# Patient Record
Sex: FEMALE | Race: WHITE | NOT HISPANIC OR LATINO | URBAN - METROPOLITAN AREA
[De-identification: names, ages, dates, MRNs, and addresses within clinical notes are randomized per-mention and may not be internally consistent; named-entity substitution may affect disease eponyms.]

---

## 2024-01-17 ENCOUNTER — LAB OUTSIDE AN ENCOUNTER (OUTPATIENT)
Dept: URBAN - METROPOLITAN AREA TELEHEALTH 2 | Facility: TELEHEALTH | Age: 39
End: 2024-01-17

## 2024-01-17 ENCOUNTER — TELEPHONE ENCOUNTER (OUTPATIENT)
Dept: URBAN - NONMETROPOLITAN AREA CLINIC 2 | Facility: CLINIC | Age: 39
End: 2024-01-17

## 2024-01-17 ENCOUNTER — OFFICE VISIT (OUTPATIENT)
Dept: URBAN - METROPOLITAN AREA TELEHEALTH 2 | Facility: TELEHEALTH | Age: 39
End: 2024-01-17
Payer: COMMERCIAL

## 2024-01-17 ENCOUNTER — DASHBOARD ENCOUNTERS (OUTPATIENT)
Age: 39
End: 2024-01-17

## 2024-01-17 DIAGNOSIS — K58.1 IBS: ICD-10-CM

## 2024-01-17 DIAGNOSIS — R10.31 RLQ ABDOMINAL PAIN: ICD-10-CM

## 2024-01-17 DIAGNOSIS — R11.0 NAUSEA: ICD-10-CM

## 2024-01-17 PROBLEM — 422587007: Status: ACTIVE | Noted: 2024-01-17

## 2024-01-17 PROBLEM — 301754002: Status: ACTIVE | Noted: 2024-01-17

## 2024-01-17 PROBLEM — 10743008: Status: ACTIVE | Noted: 2024-01-17

## 2024-01-17 PROCEDURE — 99214 OFFICE O/P EST MOD 30 MIN: CPT | Performed by: NURSE PRACTITIONER

## 2024-01-17 RX ORDER — SODIUM, POTASSIUM,MAG SULFATES 17.5-3.13G
177 MILLILITER SOLUTION, RECONSTITUTED, ORAL ORAL
Qty: 1 UNSPECIFIED | Refills: 0
Start: 2024-01-17 | End: 2024-01-24

## 2024-01-17 RX ORDER — SODIUM, POTASSIUM,MAG SULFATES 17.5-3.13G
AS DIRECTED SOLUTION, RECONSTITUTED, ORAL ORAL
OUTPATIENT
Start: 2024-01-17

## 2024-01-17 NOTE — HPI-TODAY'S VISIT:
1/17/2024 Bessy presents for evaluation of nausea, early satiety, and right lower quadrant abdominal pain.  For several years she has had intermittent postprandial nausea and early satiety.  She has a history of gluten sensitivity, and she has been off gluten for years.  She does have chronic EBV, she reports early satiety and lack of appetite particularly with her evening meal.  She does still have her gallbladder.  She does take ketamine treatments for her anxiety and depression.  Her last EGD was by Dr. Valadez.  She is never had a colonoscopy.  She also reports alternating bowel patterns with constipation and diarrhea.  She is never had a colonoscopy.  She has had increased right lower quadrant abdominal pain with a negative GYN workup.  Today she agrees to ultrasound the gallbladder and gastric emptying study for further evaluation of her nausea, we will try Pepcid 20 mg before her largest meal.  Will schedule colonoscopy given her right lower quadrant abdominal pain and bowel irregularity.  I suspect she has functional GI disease.  I want her to start 1 capsule of Metamucil and a half of a capful of MiraLAX daily.  Will follow-up pending her workup.  MB

## 2024-01-24 ENCOUNTER — TELEPHONE ENCOUNTER (OUTPATIENT)
Dept: URBAN - NONMETROPOLITAN AREA CLINIC 2 | Facility: CLINIC | Age: 39
End: 2024-01-24

## 2024-02-23 ENCOUNTER — LAB (OUTPATIENT)
Dept: URBAN - METROPOLITAN AREA CLINIC 4 | Facility: CLINIC | Age: 39
End: 2024-02-23
Payer: COMMERCIAL

## 2024-02-23 ENCOUNTER — COLON (OUTPATIENT)
Dept: URBAN - NONMETROPOLITAN AREA SURGERY CENTER 1 | Facility: SURGERY CENTER | Age: 39
End: 2024-02-23

## 2024-02-23 DIAGNOSIS — K63.89 OTHER SPECIFIED DISEASES OF INTESTINE: ICD-10-CM

## 2024-02-23 PROCEDURE — 88313 SPECIAL STAINS GROUP 2: CPT | Performed by: PATHOLOGY

## 2024-02-23 PROCEDURE — 88342 IMHCHEM/IMCYTCHM 1ST ANTB: CPT | Performed by: PATHOLOGY

## 2024-02-23 PROCEDURE — 88305 TISSUE EXAM BY PATHOLOGIST: CPT | Performed by: PATHOLOGY
